# Patient Record
Sex: FEMALE | Race: WHITE | NOT HISPANIC OR LATINO
[De-identification: names, ages, dates, MRNs, and addresses within clinical notes are randomized per-mention and may not be internally consistent; named-entity substitution may affect disease eponyms.]

---

## 2019-10-09 PROBLEM — Z00.00 ENCOUNTER FOR PREVENTIVE HEALTH EXAMINATION: Status: ACTIVE | Noted: 2019-10-09

## 2020-11-29 ENCOUNTER — TRANSCRIPTION ENCOUNTER (OUTPATIENT)
Age: 65
End: 2020-11-29

## 2022-05-22 DIAGNOSIS — M25.561 PAIN IN RIGHT KNEE: ICD-10-CM

## 2022-05-23 ENCOUNTER — NON-APPOINTMENT (OUTPATIENT)
Age: 67
End: 2022-05-23

## 2022-05-23 ENCOUNTER — APPOINTMENT (OUTPATIENT)
Dept: ORTHOPEDIC SURGERY | Facility: CLINIC | Age: 67
End: 2022-05-23
Payer: MEDICARE

## 2022-05-23 VITALS — BODY MASS INDEX: 26.9 KG/M2 | WEIGHT: 137 LBS | HEIGHT: 60 IN

## 2022-05-23 PROCEDURE — 73564 X-RAY EXAM KNEE 4 OR MORE: CPT | Mod: RT

## 2022-05-23 PROCEDURE — 99204 OFFICE O/P NEW MOD 45 MIN: CPT

## 2022-05-23 NOTE — DISCUSSION/SUMMARY
[de-identified] : Discussed at length the natural history of RIGHT knee degenerative arthritis and reviewed non-operative and operative treatment. Due to the pain, failure of prior nonoperative treatment including injections, NSAIDs, and physiotherapy, and associated disability I recommend a RIGHT total knee replacement. The risks, benefits, convalescence and alternatives were reviewed. Numerous questions were asked and answered. We did discuss implant choice and fixation, with shared decision making with the patient. Models were used as an educational tool. Surgery will be scheduled at a convenient time. Preop medical clearance. \par \par In regards to her left knee replacement, she appears she may have flexion instability. I discussed with her that we need to obtain a copy of her surgical records from her surgeon for review. We will get full evaluation of her left knee after right total knee replacement including xrays and other appropriate tests.\par \par

## 2022-05-23 NOTE — HISTORY OF PRESENT ILLNESS
[de-identified] : 66 year old patient presents with her  for initial evaluation of chronic right knee pain x years. She states pain is located on the medial aspect of the knee and is dull and sharp at times. She notes swelling and stiffness. She has difficulty with stairs. The patient uses an elastic sleeve brace at times. She is taking Tylenol and naproxen on occasion. She has gone to PT in the past and currently does home ex program with her exercise bike. She has also tried gel injections and cortisone in the past. The patient had left knee replacement surgery in 2019. She states her left knee swells at times. She states she is unable to kneel on the left knee. \par PMHx: HTN, HLD.\par

## 2022-05-23 NOTE — PHYSICAL EXAM
[de-identified] : General appearance: well nourished and hydrated, pleasant, alert and oriented x 3, cooperative.\par \par HEENT: Normocephalic, EOM intact, Nasal septum midline, Oral cavity clear, External auditory canal clear.\par Cardiovascular: no apparent abnormalities, no lower leg edema, no varicosities, pedal pulses are palpable.\par Lymphatics Lymph nodes: none palpated, Lymphedema: not present.\par Neurologic: sensation is normal, no muscle weakness in upper  lower extremities, patella tendon reflexes intact .\par Dermatologic no apparent skin lesions, moist, warm, no rash.\par Spine:cervical spine appears normal and moves freely, thoracic spine appears normal and moves freely, lumbosacral spine appears normal and moves freely.\par \par Gait: nonantalgic.\par \par \par Left knee\par Inspection: trace effusion or erythema.\par Wounds: healed midline incisoin\par Alignment: normal.\par Palpation: no specific tenderness on palpation.\par ROM active (in degrees): 0-120 - stable in full extension, AP translation 5-10mm,\par Ligamentous laxity:  medial lateral laxity 3-5 mm ;  negative ant. drawer test, negative post. drawer test, stable to varus stress test, stable to valgus stress test. negative Lachman's test, negative pivot shift test\par Meniscal Test: negative McMurrays, negative Tia.\par Patellofemoral Alignment Test: Q angle-, normal.\par Muscle Test: good quad strength.\par \par \par Right knee\par \par Inspection: no effusion or erythema.\par Wounds: none.\par Alignment: normal.\par Palpation: medial tenderness on palpation.\par ROM active (in degrees): 0-130 with crepitus \par Ligamentous laxity: all ligaments appear stable,, negative ant. drawer test, negative post. drawer test, stable to varus stress test, stable to valgus stress test. negative Lachman's test, negative pivot shift test\par Meniscal Test: negative McMurrays, negative Tia.\par Patellofemoral Alignment Test: Q angle-, normal.\par Muscle Test: good quad strength.\par \par \par Left hip\par \par Inspection: No swelling or ecchymosis.\par Wounds: none.\par Palpation: non-tender.\par Stability: no instability.\par Strength: 5/5 all motor groups.\par ROM: no pain with FROM.\par Leg length: equal.\par \par \par Right hip\par \par Inspection: No swelling or ecchymosis.\par Wounds: none\par Palpation: non-tender.\par Stability: no instability.\par Strength: 5/5 all motor groups.\par ROM: no pain with FROM.\par Leg length: equal.\par \par \par Left ankle\par \par Inspection: no erythema noted, no swelling noted.\par Palpation: no pain on palpation .\par ROM: FROM without crepitus.\par Muscle strength: 5/5.\par Stability: no instability noted.\par \par \par Right ankle\par \par Inspection: no erythema noted, no swelling noted.\par ROM: FROM without crepitus\par Palpation: no pain on palpation .\par Muscle strength: 5/5.\par Stability: no instability noted.\par \par \par Left foot\par \par Inspection: color, texture and turgor are normal.\par ROM: full range of motion of all joints without pain or crepitus.\par Palpation: no tenderness.\par Stability: no instability noted.\par \par \par Right foot\par \par Inspection: color, texture and turgor are normal.\par ROM: full range of motion of all joints without pain or crepitus.\par Palpation: no tenderness.\par Stability: no instability noted.\par \par \par Left shoulder\par \par Inspection: no muscle asymmetry, no atrophy.\par Palpation: no tenderness noted, ACJ non-tender.\par ROM: full active ROM, full passive ROM.\par Strength testing): anterior deltoid, supraspinatus, infraspinatus, subscapularis all 5/5.\par Stability test: ant. apprehension negative, post. apprehension negative, relocation test negative.\par Impingement Test: negative NEER.\par \par \par Right shoulder\par \par Inspection: no muscle asymmetry, no atrophy.\par Palpation: no tenderness noted, ACJ non-tender.\par ROM: full active ROM, full passive ROM.\par Strength testing): anterior deltoid, supraspinatus, infraspinatus, subscapularis all 5/5.\par Stability test: ant. apprehension negative, post. apprehension negative, relocation test negative.\par Impingement Test: negative NEER.\par Surgical Wounds: none.\par \par \par Left elbow\par \par Inspection: negative swelling.\par Wounds: none.\par Palpation: non-tender.\par ROM: full ROM.\par Strength: 5/5 all groups.\par Stability: no instability.\par Mass: none.\par \par \par Right elbow\par \par Inspection: negative swelling.\par Wounds: none.\par Palpation: non-tender.\par ROM: full ROM.\par Strength: 5/5 all groups.\par Stability: no instability.\par Mass: none.\par \par \par Left wrist\par \par Inspection: negative swelling\par Wound: none.\par Palpation (bone): no tenderness.\par ROM: full ROM.\par Strength: full , good.\par \par \par Right wrist\par \par Inspection: negative swelling.\par Wound: none\par Palpation bone): no tenderness.\par ROM: full ROM.\par Strength: full , good.\par \par \par Left hand\par \par Inspection: no skin changes, normal appearance.\par Wounds: none.Strength: full , able to make full fist.\par Sensation: light touch intact all fingers and thumb.\par Vascular: good capillary refill < 3 seconds, all fingers and thumb.\par Mass: none.\par \par Right hand\par \par Inspection: no skin changes, normal appearance.\par Wounds: none.Strength: full , able to make full fist.\par Sensation: light touch intact all fingers and thumb.\par Vascular: good capillary refill < 3 seconds, all fingers and thumb.\par Mass: none."  [de-identified] : Right knee xrays, standing AP/Lateral and Merchant films, and 45 degree PA standing view, taken at the office today show diffuse tricompartmental degenerative arthritis, medial joint space narrowing,  bone on bone, sclerosis, marginal osteophytes, patella sits centrally with patellofemoral joint space narrowing and osteophyte formation consistent with patellofemoral arthritis, Kellgren and Alverto grade 4. \par \par Left knee xray merchant view taken at the office today demonstrates a total knee replacement with the patella in a central position.

## 2022-05-23 NOTE — ADDENDUM
[FreeTextEntry1] : This note was written by Galen Mello on 05/23/2022 acting as scribe for Dr. Ryan Melchor M.D.\par \par I, Dr. Ryan Melchor, have read and attest that all the information, medical decision making and discharge instructions within are true and accurate. 
no smoking or etoh

## 2022-06-17 ENCOUNTER — OUTPATIENT (OUTPATIENT)
Dept: OUTPATIENT SERVICES | Facility: HOSPITAL | Age: 67
LOS: 1 days | Discharge: HOME | End: 2022-06-17
Payer: MEDICARE

## 2022-06-17 VITALS
RESPIRATION RATE: 18 BRPM | HEART RATE: 80 BPM | OXYGEN SATURATION: 98 % | TEMPERATURE: 98 F | SYSTOLIC BLOOD PRESSURE: 153 MMHG | WEIGHT: 136.91 LBS | DIASTOLIC BLOOD PRESSURE: 88 MMHG

## 2022-06-17 DIAGNOSIS — M17.11 UNILATERAL PRIMARY OSTEOARTHRITIS, RIGHT KNEE: ICD-10-CM

## 2022-06-17 DIAGNOSIS — Z01.818 ENCOUNTER FOR OTHER PREPROCEDURAL EXAMINATION: ICD-10-CM

## 2022-06-17 DIAGNOSIS — Z96.652 PRESENCE OF LEFT ARTIFICIAL KNEE JOINT: Chronic | ICD-10-CM

## 2022-06-17 DIAGNOSIS — I10 ESSENTIAL (PRIMARY) HYPERTENSION: ICD-10-CM

## 2022-06-17 DIAGNOSIS — E03.9 HYPOTHYROIDISM, UNSPECIFIED: ICD-10-CM

## 2022-06-17 DIAGNOSIS — E78.5 HYPERLIPIDEMIA, UNSPECIFIED: ICD-10-CM

## 2022-06-17 LAB
A1C WITH ESTIMATED AVERAGE GLUCOSE RESULT: 6.1 % — HIGH (ref 4–5.6)
ALBUMIN SERPL ELPH-MCNC: 3.9 G/DL — SIGNIFICANT CHANGE UP (ref 3.3–5)
ALP SERPL-CCNC: 142 U/L — HIGH (ref 40–120)
ALT FLD-CCNC: 37 U/L — SIGNIFICANT CHANGE UP (ref 12–78)
ANION GAP SERPL CALC-SCNC: 6 MMOL/L — SIGNIFICANT CHANGE UP (ref 5–17)
APPEARANCE UR: CLEAR — SIGNIFICANT CHANGE UP
APTT BLD: 27.1 SEC — LOW (ref 27.5–35.5)
AST SERPL-CCNC: 25 U/L — SIGNIFICANT CHANGE UP (ref 15–37)
BACTERIA # UR AUTO: ABNORMAL
BASOPHILS # BLD AUTO: 0.05 K/UL — SIGNIFICANT CHANGE UP (ref 0–0.2)
BASOPHILS NFR BLD AUTO: 0.6 % — SIGNIFICANT CHANGE UP (ref 0–2)
BILIRUB SERPL-MCNC: 0.5 MG/DL — SIGNIFICANT CHANGE UP (ref 0.2–1.2)
BILIRUB UR-MCNC: NEGATIVE — SIGNIFICANT CHANGE UP
BLD GP AB SCN SERPL QL: SIGNIFICANT CHANGE UP
BUN SERPL-MCNC: 27 MG/DL — HIGH (ref 7–23)
CALCIUM SERPL-MCNC: 9.6 MG/DL — SIGNIFICANT CHANGE UP (ref 8.5–10.1)
CHLORIDE SERPL-SCNC: 105 MMOL/L — SIGNIFICANT CHANGE UP (ref 96–108)
CO2 SERPL-SCNC: 29 MMOL/L — SIGNIFICANT CHANGE UP (ref 22–31)
COLOR SPEC: YELLOW — SIGNIFICANT CHANGE UP
CREAT SERPL-MCNC: 0.97 MG/DL — SIGNIFICANT CHANGE UP (ref 0.5–1.3)
DIFF PNL FLD: ABNORMAL
EGFR: 64 ML/MIN/1.73M2 — SIGNIFICANT CHANGE UP
EOSINOPHIL # BLD AUTO: 0.21 K/UL — SIGNIFICANT CHANGE UP (ref 0–0.5)
EOSINOPHIL NFR BLD AUTO: 2.4 % — SIGNIFICANT CHANGE UP (ref 0–6)
EPI CELLS # UR: SIGNIFICANT CHANGE UP
ESTIMATED AVERAGE GLUCOSE: 128 MG/DL — HIGH (ref 68–114)
GLUCOSE SERPL-MCNC: 117 MG/DL — HIGH (ref 70–99)
GLUCOSE UR QL: NEGATIVE MG/DL — SIGNIFICANT CHANGE UP
HCT VFR BLD CALC: 39.2 % — SIGNIFICANT CHANGE UP (ref 34.5–45)
HGB BLD-MCNC: 13.8 G/DL — SIGNIFICANT CHANGE UP (ref 11.5–15.5)
IMM GRANULOCYTES NFR BLD AUTO: 0.2 % — SIGNIFICANT CHANGE UP (ref 0–1.5)
INR BLD: 0.93 RATIO — SIGNIFICANT CHANGE UP (ref 0.88–1.16)
KETONES UR-MCNC: NEGATIVE — SIGNIFICANT CHANGE UP
LEUKOCYTE ESTERASE UR-ACNC: NEGATIVE — SIGNIFICANT CHANGE UP
LYMPHOCYTES # BLD AUTO: 3.97 K/UL — HIGH (ref 1–3.3)
LYMPHOCYTES # BLD AUTO: 44.5 % — HIGH (ref 13–44)
MCHC RBC-ENTMCNC: 31.2 PG — SIGNIFICANT CHANGE UP (ref 27–34)
MCHC RBC-ENTMCNC: 35.2 G/DL — SIGNIFICANT CHANGE UP (ref 32–36)
MCV RBC AUTO: 88.7 FL — SIGNIFICANT CHANGE UP (ref 80–100)
MONOCYTES # BLD AUTO: 0.49 K/UL — SIGNIFICANT CHANGE UP (ref 0–0.9)
MONOCYTES NFR BLD AUTO: 5.5 % — SIGNIFICANT CHANGE UP (ref 2–14)
MRSA PCR RESULT.: SIGNIFICANT CHANGE UP
NEUTROPHILS # BLD AUTO: 4.19 K/UL — SIGNIFICANT CHANGE UP (ref 1.8–7.4)
NEUTROPHILS NFR BLD AUTO: 46.8 % — SIGNIFICANT CHANGE UP (ref 43–77)
NITRITE UR-MCNC: NEGATIVE — SIGNIFICANT CHANGE UP
NRBC # BLD: 0 /100 WBCS — SIGNIFICANT CHANGE UP (ref 0–0)
PH UR: 5 — SIGNIFICANT CHANGE UP (ref 5–8)
PLATELET # BLD AUTO: 226 K/UL — SIGNIFICANT CHANGE UP (ref 150–400)
POTASSIUM SERPL-MCNC: 4 MMOL/L — SIGNIFICANT CHANGE UP (ref 3.5–5.3)
POTASSIUM SERPL-SCNC: 4 MMOL/L — SIGNIFICANT CHANGE UP (ref 3.5–5.3)
PROT SERPL-MCNC: 8.2 GM/DL — SIGNIFICANT CHANGE UP (ref 6–8.3)
PROT UR-MCNC: NEGATIVE MG/DL — SIGNIFICANT CHANGE UP
PROTHROM AB SERPL-ACNC: 11.1 SEC — SIGNIFICANT CHANGE UP (ref 10.5–13.4)
RBC # BLD: 4.42 M/UL — SIGNIFICANT CHANGE UP (ref 3.8–5.2)
RBC # FLD: 11.4 % — SIGNIFICANT CHANGE UP (ref 10.3–14.5)
RBC CASTS # UR COMP ASSIST: SIGNIFICANT CHANGE UP /HPF (ref 0–4)
S AUREUS DNA NOSE QL NAA+PROBE: SIGNIFICANT CHANGE UP
SODIUM SERPL-SCNC: 140 MMOL/L — SIGNIFICANT CHANGE UP (ref 135–145)
SP GR SPEC: 1.02 — SIGNIFICANT CHANGE UP (ref 1.01–1.02)
UROBILINOGEN FLD QL: NEGATIVE MG/DL — SIGNIFICANT CHANGE UP
WBC # BLD: 8.93 K/UL — SIGNIFICANT CHANGE UP (ref 3.8–10.5)
WBC # FLD AUTO: 8.93 K/UL — SIGNIFICANT CHANGE UP (ref 3.8–10.5)
WBC UR QL: SIGNIFICANT CHANGE UP

## 2022-06-17 PROCEDURE — 93010 ELECTROCARDIOGRAM REPORT: CPT

## 2022-06-17 NOTE — OCCUPATIONAL THERAPY INITIAL EVALUATION ADULT - PERTINENT HX OF CURRENT PROBLEM, REHAB EVAL
R knee OA which impacts pts ability to perform functional tasks/transfers and mobility. Pt is scheduled for R TKR on 6/28/22.

## 2022-06-17 NOTE — H&P PST ADULT - NEGATIVE MUSCULOSKELETAL SYMPTOMS
no arthralgia/no joint swelling/no myalgia/no muscle cramps/no muscle weakness/no neck pain/no arm pain L/no arm pain R/no back pain/no leg pain L

## 2022-06-17 NOTE — H&P PST ADULT - PRIMARY CARE PROVIDER
Home health referral sent to [facility] per choice form.  Awaiting response.   Lynette Jimenez [phone]

## 2022-06-17 NOTE — H&P PST ADULT - NSICDXPASTMEDICALHX_GEN_ALL_CORE_FT
PAST MEDICAL HISTORY:  Hyperlipidemia     Hypertension     Hypothyroidism     Osteoarthritis, knee right

## 2022-06-17 NOTE — H&P PST ADULT - MUSCULOSKELETAL
no joint swelling/no joint erythema/no joint warmth/no calf tenderness/decreased ROM due to pain/normal gait/strength 5/5 bilateral upper extremities/strength 5/5 bilateral lower extremities details…

## 2022-06-17 NOTE — PHYSICAL THERAPY INITIAL EVALUATION ADULT - ADDITIONAL COMMENTS
Pt lives in a private house, 4 entry steps ( B wide handrail), 13 steps (first 6 steps B close HR and remainder has L HR) to bedroom.  Pt has a  walk in shower stall with built in bench with a fixed/retractable shower head, comfort toilet seat height, & no grab bars.  Pain at rest 6/10 aggravating factors include positioning/prolonged sitting. Pain easing factors include rest. Pt endorses taking Tramadol for pain management. Denies adverse reaction to pain medication. Pt is right hand dominant, wears eye glasses, drives, no hearing impairments.  Does not attend Outpatient PT, denies recent falls and occasional buckling reported. PSHx: L TKA 2019

## 2022-06-17 NOTE — OCCUPATIONAL THERAPY INITIAL EVALUATION ADULT - ADDITIONAL COMMENTS
2.26
Pt lives with spouse (Who can assist post op) in a private house with 3-4 steps with bilateral handrails (Wide apart) to enter. Once inside, the pt has 12-13 steps with a rails (R rail for first 6 steps and then bilateral handrails that are close together) to reach the main floor where the bedroom and bathroom is. The pt bathroom has a walk in shower stall, fixed/retractable shower head, comfort height toilet seat and no grab bars. The pt deferred the 3/1 commode stating "I don't need it. I didn't need it for my last surgery". OT educated pt on importance of 3/1 commode for safety, but pt still deferred. Pt ambulates with no device and owns a cane and a rolling walker. The pt daily pain is a 6/10 at rest and a 9/10 with movement. The pt manages the pain with rest, positioning and Tylenol. The pt has no recent outpatient PT, no recent falls and has buckling of the knees once in awhile. The pt wears glasses for reading and distance, R handed, drives and has no hearing impairments.

## 2022-06-17 NOTE — H&P PST ADULT - PROBLEM SELECTOR PLAN 1
Pre-op instructions given. Pt verbalized understanding  Chlorhexidine wash + ENSURE clear instructions given  Pending: M/C for abnormal ekg  Pending: Covidpcr test/results

## 2022-06-17 NOTE — PHYSICAL THERAPY INITIAL EVALUATION ADULT - ASR EQUIP NEEDS DISCH PT EVAL
Pt owns RW/straight cane. PT educated pt on benefit of 3:1 commode and pt continued to refuse stating "I dont need it."

## 2022-06-17 NOTE — H&P PST ADULT - HISTORY OF PRESENT ILLNESS
67yo female with medical h/o HTN, Hypothyroid, HDL, and OA, right knee, c/o right knee pain affecting ADL. Pt presents today for PST for scheduled Right Total Knee Replacement on 6/28/2022

## 2022-06-17 NOTE — OCCUPATIONAL THERAPY INITIAL EVALUATION ADULT - ANTICIPATED DISCHARGE DISPOSITION, OT EVAL
normal...
Home with home OT for home safety evaluation and to improve functional ADLs and transfers/mobility.

## 2022-06-17 NOTE — H&P PST ADULT - FALL HARM RISK - UNIVERSAL INTERVENTIONS
Bed in lowest position, wheels locked, appropriate side rails in place/Call bell, personal items and telephone in reach/Instruct patient to call for assistance before getting out of bed or chair/Non-slip footwear when patient is out of bed/[place] to call system/Physically safe environment - no spills, clutter or unnecessary equipment/Purposeful Proactive Rounding/Room/bathroom lighting operational, light cord in reach

## 2022-06-18 LAB
CULTURE RESULTS: SIGNIFICANT CHANGE UP
SPECIMEN SOURCE: SIGNIFICANT CHANGE UP

## 2022-06-28 ENCOUNTER — OUTPATIENT (OUTPATIENT)
Dept: OUTPATIENT SERVICES | Facility: HOSPITAL | Age: 67
LOS: 1 days | Discharge: HOME | End: 2022-06-28

## 2022-06-28 ENCOUNTER — TRANSCRIPTION ENCOUNTER (OUTPATIENT)
Age: 67
End: 2022-06-28

## 2022-06-28 ENCOUNTER — APPOINTMENT (OUTPATIENT)
Dept: ORTHOPEDIC SURGERY | Facility: HOSPITAL | Age: 67
End: 2022-06-28

## 2022-06-28 DIAGNOSIS — Z96.652 PRESENCE OF LEFT ARTIFICIAL KNEE JOINT: Chronic | ICD-10-CM

## 2022-06-28 RX ORDER — ATORVASTATIN CALCIUM 80 MG/1
1 TABLET, FILM COATED ORAL
Qty: 0 | Refills: 0 | DISCHARGE

## 2022-06-28 RX ORDER — METOPROLOL TARTRATE 50 MG
1 TABLET ORAL
Qty: 0 | Refills: 0 | DISCHARGE

## 2022-06-28 RX ORDER — LEVOTHYROXINE SODIUM 125 MCG
1 TABLET ORAL
Qty: 0 | Refills: 0 | DISCHARGE

## 2022-06-29 ENCOUNTER — TRANSCRIPTION ENCOUNTER (OUTPATIENT)
Age: 67
End: 2022-06-29

## 2022-06-29 PROBLEM — I10 ESSENTIAL (PRIMARY) HYPERTENSION: Chronic | Status: ACTIVE | Noted: 2022-06-17

## 2022-06-29 PROBLEM — E78.5 HYPERLIPIDEMIA, UNSPECIFIED: Chronic | Status: ACTIVE | Noted: 2022-06-17

## 2022-06-29 PROBLEM — M17.10 UNILATERAL PRIMARY OSTEOARTHRITIS, UNSPECIFIED KNEE: Chronic | Status: ACTIVE | Noted: 2022-06-17

## 2022-06-29 PROBLEM — E03.9 HYPOTHYROIDISM, UNSPECIFIED: Chronic | Status: ACTIVE | Noted: 2022-06-17

## 2022-07-02 DIAGNOSIS — M17.11 UNILATERAL PRIMARY OSTEOARTHRITIS, RIGHT KNEE: ICD-10-CM

## 2022-07-02 DIAGNOSIS — E03.9 HYPOTHYROIDISM, UNSPECIFIED: ICD-10-CM

## 2022-07-02 DIAGNOSIS — Z96.652 PRESENCE OF LEFT ARTIFICIAL KNEE JOINT: ICD-10-CM

## 2022-07-02 DIAGNOSIS — E78.5 HYPERLIPIDEMIA, UNSPECIFIED: ICD-10-CM

## 2022-07-02 DIAGNOSIS — I10 ESSENTIAL (PRIMARY) HYPERTENSION: ICD-10-CM

## 2022-07-05 RX ORDER — OXYCODONE 5 MG/1
5 TABLET ORAL
Qty: 40 | Refills: 0 | Status: ACTIVE | COMMUNITY
Start: 2022-07-05 | End: 1900-01-01

## 2022-07-18 VITALS — BODY MASS INDEX: 26.9 KG/M2 | WEIGHT: 137 LBS | HEIGHT: 60 IN

## 2022-07-20 ENCOUNTER — APPOINTMENT (OUTPATIENT)
Dept: ORTHOPEDIC SURGERY | Facility: CLINIC | Age: 67
End: 2022-07-20

## 2022-07-20 PROCEDURE — 99024 POSTOP FOLLOW-UP VISIT: CPT

## 2022-07-20 RX ORDER — NAPROXEN 500 MG/1
500 TABLET ORAL
Qty: 60 | Refills: 0 | Status: ACTIVE | COMMUNITY
Start: 2022-07-20 | End: 1900-01-01

## 2022-07-20 NOTE — HISTORY OF PRESENT ILLNESS
[Clean/Dry/Intact] : clean, dry and intact [Healed] : healed [Swelling] : swollen [Neuro Intact] : an unremarkable neurological exam [Vascular Intact] : ~T peripheral vascular exam normal [Negative Mercedes's] : maneuvers demonstrated a negative Mercedes's sign [Doing Well] : is doing well [No Sign of Infection] : is showing no signs of infection [Adequate Pain Control] : has adequate pain control [Staples Removed] : staples were removed [Chills] : no chills [Constipation] : no constipation [Diarrhea] : no diarrhea [Dysuria] : no dysuria [Fever] : no fever [Nausea] : no nausea [Vomiting] : no vomiting [Erythema] : not erythematous [Discharge] : absent of discharge [Dehiscence] : not dehisced [de-identified] : Post-op visit [de-identified] : Patient presents today for the F/U S/P Right TKR done 3 weeks ago. Patient is doing well and undergoing P.T> with improvement. Takes pain meds and DVT prophylaxis. I went over post-op care and answered all her questions. I also provided her with surgical report for her records. [de-identified] : ROM 5-90 degrees [de-identified] : Continue P.T>, pain meds and DVT prophylaxis. F/U in 1 month with x-rays.

## 2022-07-20 NOTE — PROCEDURE
[de-identified] : Observation on incision dry, clean, intact, well healed. Method staple removing kit. Suture site Cleaned with iodine swab after sutures are completely removed. Instructions Keep incision dry and clean, allowed to shower and pat site dry, do not rub dry, contact office is site becomes red, swollen, infected, or you develop a fever. \par \par

## 2022-08-19 ENCOUNTER — APPOINTMENT (OUTPATIENT)
Dept: ORTHOPEDIC SURGERY | Facility: CLINIC | Age: 67
End: 2022-08-19

## 2022-08-19 VITALS — WEIGHT: 135 LBS | BODY MASS INDEX: 26.5 KG/M2 | HEIGHT: 60 IN

## 2022-08-19 DIAGNOSIS — Z96.652 PRESENCE OF LEFT ARTIFICIAL KNEE JOINT: ICD-10-CM

## 2022-08-19 DIAGNOSIS — Z96.651 PRESENCE OF RIGHT ARTIFICIAL KNEE JOINT: ICD-10-CM

## 2022-08-19 DIAGNOSIS — M17.11 UNILATERAL PRIMARY OSTEOARTHRITIS, RIGHT KNEE: ICD-10-CM

## 2022-08-19 PROCEDURE — 99024 POSTOP FOLLOW-UP VISIT: CPT

## 2022-08-19 PROCEDURE — 73562 X-RAY EXAM OF KNEE 3: CPT | Mod: RT

## 2022-08-19 NOTE — PHYSICAL EXAM
[de-identified] : General appearance: well nourished and hydrated, pleasant, alert and oriented x 3, cooperative.\par HEENT: Normocephalic, EOM intact, Nasal septum midline, Oral cavity clear, External auditory canal clear.\par Cardiovascular: no apparent abnormalities, no lower leg edema, no varicosities, pedal pulses are palpable.\par Lymphatics Lymph nodes: none palpated, Lymphedema: not present.\par Neurologic: sensation is normal, no muscle weakness in upper or lower extremities, patella tendon reflexes intact .\par Dermatologic no apparent skin lesions, moist, warm, no rash.\par Spine:cervical spine appears normal and moves freely, thoracic spine appears normal and moves freely, lumbosacral spine appears normal and moves freely.\par Gait: nonantalgic.\par \par Right Knee\par Inspection: no effusion, mild soft tissue swelling\par Wounds: healed midline incision\par Alignment: normal.\par Palpation: no specific tenderness on palpation.\par ROM: Active (in degrees): 0-95\par Ligamentous laxity (neg): negative ant. drawer test, negative post. drawer test, stable to varus stress test, stable to valgus stress test,\par Patellofemoral Alignment Test: Q angle-, normal.\par Muscle Test: good quad strength.\par Leg examination: calf is soft and non-tender. [de-identified] : Right knee xray, AP, lateral, merchant view taken at the office today demonstrates a total knee replacement in satisfactory position and alignment. No evidence of loosening. The patella sits in a central position.\par \par Left knee xray merchant view taken at the office today demonstrates a total knee replacement with the patella in a central position.

## 2022-08-19 NOTE — HISTORY OF PRESENT ILLNESS
[de-identified] : DAGO ROSE is a 66 year old female who presents for follow up evaluation s/p right TKR at 2 months. Pt is doing well but c/o mild pain along the medial side of the knee during PT/kneeling. Denies taking any pain medications. She would like to know if she has to continue taking Aspirin.

## 2022-08-19 NOTE — ADDENDUM
[FreeTextEntry1] : This note was written by Karoline Lima on 08/19/2022 acting as scribe for Dr. Ryan Melchor M.D.\par \par I, Dr. Ryan Melchor, have read and attest that all the information, medical decision making and discharge instructions within are true and accurate.

## 2022-10-07 ENCOUNTER — APPOINTMENT (OUTPATIENT)
Dept: ORTHOPEDIC SURGERY | Facility: CLINIC | Age: 67
End: 2022-10-07

## 2022-10-07 DIAGNOSIS — T84.098A OTHER MECHANICAL COMPLICATION OF OTHER INTERNAL JOINT PROSTHESIS, INITIAL ENCOUNTER: ICD-10-CM

## 2022-10-07 DIAGNOSIS — M17.0 BILATERAL PRIMARY OSTEOARTHRITIS OF KNEE: ICD-10-CM

## 2022-10-07 DIAGNOSIS — Z96.653 PRESENCE OF ARTIFICIAL KNEE JOINT, BILATERAL: ICD-10-CM

## 2022-10-07 DIAGNOSIS — Z96.659 OTHER MECHANICAL COMPLICATION OF OTHER INTERNAL JOINT PROSTHESIS, INITIAL ENCOUNTER: ICD-10-CM

## 2022-10-07 PROCEDURE — 99213 OFFICE O/P EST LOW 20 MIN: CPT

## 2022-10-07 PROCEDURE — 73562 X-RAY EXAM OF KNEE 3: CPT | Mod: 50

## 2022-10-07 NOTE — DISCUSSION/SUMMARY
[de-identified] : Pt is doing well s/p right TKR at 3 months. At this time, the patient can continue with HEP to work on ROM and activities as tolerated.\par \par As for her left knee, we discussed at length the nature of the patient’s condition. Their left knee symptoms appear to be related to flexion instability. I recommended that the patient continue with strengthening exercises. She can continue activities as tolerated. It was discussed in the future, her symptoms may involve surgical intervention. However, the patient would like to avoid surgery at this time and will continue with nonoperative management. All questions answered, understanding verbalized. Patient in agreement with plan of care.\par \par I will see her back in 3 months with xrays. \par \par 	\par

## 2022-10-07 NOTE — PHYSICAL EXAM
[de-identified] : General appearance: well nourished and hydrated, pleasant, alert and oriented x 3, cooperative.\par HEENT: Normocephalic, EOM intact, Nasal septum midline, Oral cavity clear, External auditory canal clear.\par Cardiovascular: no apparent abnormalities, no lower leg edema, no varicosities, pedal pulses are palpable.\par Lymphatics Lymph nodes: none palpated, Lymphedema: not present.\par Neurologic: sensation is normal, no muscle weakness in upper or lower extremities, patella tendon reflexes intact .\par Dermatologic no apparent skin lesions, moist, warm, no rash.\par Spine:cervical spine appears normal and moves freely, thoracic spine appears normal and moves freely, lumbosacral spine appears normal and moves freely.\par Gait: nonantalgic.\par \par Right Knee\par Inspection: no effusion, minimal soft tissue swelling\par Wounds: healed midline incision\par Alignment: normal.\par Palpation: no specific tenderness on palpation.\par ROM: Active (in degrees): 0-115\par Ligamentous laxity (neg): negative ant. drawer test, negative post. drawer test, stable to varus stress test, stable to valgus stress test,\par Patellofemoral Alignment Test: Q angle-, normal.\par Muscle Test: good quad strength.\par Leg examination: calf is soft and non-tender.\par \par Left  Knee\par Inspection: no effusion\par Wounds: healed midline incision\par Alignment: normal.\par Palpation: no specific tenderness on palpation.\par ROM: Active (in degrees): 0-120\par Ligamentous laxity: Medial and and lateral laxity. increased AP translation to 10 mm\par Patellofemoral Alignment Test: Q angle-, normal.\par Muscle Test: good quad strength.\par Leg examination: calf is soft and non-tender.		 [de-identified] : Right knee xray, AP, lateral, merchant view taken at the office today demonstrates a total knee replacement in satisfactory position and alignment. No evidence of loosening. The patella sits in a central position.\par \par Left knee xray, AP, lateral, merchant view taken at the office today demonstrates a total knee replacement in satisfactory position and alignment. No evidence of loosening. The patella sits in a central position.\par \par

## 2022-10-07 NOTE — HISTORY OF PRESENT ILLNESS
[de-identified] : DAGO ROSE is a 67 year old female who presents for follow up evaluation s/p right TKR at 3 months. Pt is doing well but c/o swelling and stiffness to the right knee. She denies any need for pain medications. She continues with a HEP and is utilizing the pool for water exercises. Of note, the patient would like to evaluate her left knee. History of left TKR in 2019 completed by Dr. Gottlieb. The pain is stated to be bother her with discomfort on a daily basis. She has no other complaints. 
no

## 2022-10-07 NOTE — ADDENDUM
[FreeTextEntry1] : This note was written by Shira Seaman on 10/07/2022 acting as scribe for Dr. Ryan Melchor M.D.\par \par I, Dr. Ryan Melchor, have read and attest that all the information, medical decision making and discharge instructions within are true and accurate.\par

## 2022-11-17 NOTE — DISCUSSION/SUMMARY
Patient informed of results in portal. [de-identified] : Pt is doing well s/p right TKR at 2 months.Patient can continue with PT to work on ROM and activities as tolerated. All questions answered, understanding verbalized. Patient in agreement with plan of care.\par \par I will see her back in 6-8 weeks with xrays of both knees.

## 2023-01-11 ENCOUNTER — APPOINTMENT (OUTPATIENT)
Dept: ORTHOPEDIC SURGERY | Facility: CLINIC | Age: 68
End: 2023-01-11

## 2023-10-11 ENCOUNTER — APPOINTMENT (OUTPATIENT)
Dept: GASTROENTEROLOGY | Facility: CLINIC | Age: 68
End: 2023-10-11
Payer: MEDICARE

## 2023-10-11 VITALS — WEIGHT: 139 LBS | OXYGEN SATURATION: 98 % | HEART RATE: 89 BPM | BODY MASS INDEX: 27.29 KG/M2 | HEIGHT: 60 IN

## 2023-10-11 DIAGNOSIS — Z86.010 PERSONAL HISTORY OF COLONIC POLYPS: ICD-10-CM

## 2023-10-11 DIAGNOSIS — Z12.11 ENCOUNTER FOR SCREENING FOR MALIGNANT NEOPLASM OF COLON: ICD-10-CM

## 2023-10-11 PROCEDURE — 99204 OFFICE O/P NEW MOD 45 MIN: CPT

## 2023-10-11 RX ORDER — ATORVASTATIN CALCIUM 80 MG/1
TABLET, FILM COATED ORAL
Refills: 0 | Status: ACTIVE | COMMUNITY

## 2023-10-11 RX ORDER — SODIUM PICOSULFATE, MAGNESIUM OXIDE, AND ANHYDROUS CITRIC ACID 12; 3.5; 1 G/175ML; G/175ML; MG/175ML
10-3.5-12 MG-GM LIQUID ORAL
Qty: 1 | Refills: 0 | Status: ACTIVE | COMMUNITY
Start: 2023-10-11 | End: 1900-01-01

## 2023-10-11 RX ORDER — METOPROLOL SUCCINATE 50 MG/1
50 TABLET, EXTENDED RELEASE ORAL
Refills: 0 | Status: ACTIVE | COMMUNITY

## 2023-10-11 RX ORDER — LEVOTHYROXINE SODIUM 0.03 MG/1
25 TABLET ORAL
Refills: 0 | Status: ACTIVE | COMMUNITY

## 2023-10-11 RX ORDER — HYDROCHLOROTHIAZIDE 25 MG/1
25 TABLET ORAL
Refills: 0 | Status: ACTIVE | COMMUNITY

## 2023-10-31 ENCOUNTER — APPOINTMENT (OUTPATIENT)
Dept: GASTROENTEROLOGY | Facility: AMBULATORY MEDICAL SERVICES | Age: 68
End: 2023-10-31
Payer: MEDICARE

## 2023-10-31 PROCEDURE — 45385 COLONOSCOPY W/LESION REMOVAL: CPT | Mod: PT
